# Patient Record
Sex: FEMALE | Race: WHITE | NOT HISPANIC OR LATINO | Employment: OTHER | URBAN - METROPOLITAN AREA
[De-identification: names, ages, dates, MRNs, and addresses within clinical notes are randomized per-mention and may not be internally consistent; named-entity substitution may affect disease eponyms.]

---

## 2017-05-18 ENCOUNTER — ALLSCRIPTS OFFICE VISIT (OUTPATIENT)
Dept: OTHER | Facility: OTHER | Age: 77
End: 2017-05-18

## 2017-05-18 DIAGNOSIS — I10 ESSENTIAL (PRIMARY) HYPERTENSION: ICD-10-CM

## 2017-05-26 ENCOUNTER — HOSPITAL ENCOUNTER (OUTPATIENT)
Dept: NON INVASIVE DIAGNOSTICS | Facility: HOSPITAL | Age: 77
Discharge: HOME | End: 2017-05-26
Attending: INTERNAL MEDICINE
Payer: MEDICARE

## 2017-05-26 DIAGNOSIS — I10 ESSENTIAL (PRIMARY) HYPERTENSION: ICD-10-CM

## 2017-05-26 PROCEDURE — 93306 TTE W/DOPPLER COMPLETE: CPT

## 2018-01-12 VITALS
BODY MASS INDEX: 36.19 KG/M2 | HEIGHT: 64 IN | DIASTOLIC BLOOD PRESSURE: 88 MMHG | WEIGHT: 212 LBS | HEART RATE: 64 BPM | SYSTOLIC BLOOD PRESSURE: 150 MMHG

## 2018-02-23 RX ORDER — ASPIRIN 325 MG
1 TABLET ORAL DAILY
COMMUNITY
Start: 2014-03-20

## 2018-02-23 RX ORDER — LEVOTHYROXINE SODIUM 88 UG/1
1 TABLET ORAL DAILY
COMMUNITY

## 2018-02-27 ENCOUNTER — OFFICE VISIT (OUTPATIENT)
Dept: CARDIOLOGY CLINIC | Facility: HOSPITAL | Age: 78
End: 2018-02-27
Payer: MEDICARE

## 2018-02-27 VITALS
WEIGHT: 213.6 LBS | HEART RATE: 67 BPM | BODY MASS INDEX: 36.47 KG/M2 | SYSTOLIC BLOOD PRESSURE: 134 MMHG | HEIGHT: 64 IN | DIASTOLIC BLOOD PRESSURE: 62 MMHG

## 2018-02-27 DIAGNOSIS — I10 ESSENTIAL HYPERTENSION: ICD-10-CM

## 2018-02-27 DIAGNOSIS — R00.2 PALPITATION: ICD-10-CM

## 2018-02-27 DIAGNOSIS — I35.0 MILD AORTIC STENOSIS: ICD-10-CM

## 2018-02-27 DIAGNOSIS — I47.1 PAROXYSMAL SVT (SUPRAVENTRICULAR TACHYCARDIA) (HCC): Primary | ICD-10-CM

## 2018-02-27 PROBLEM — I47.10 PAROXYSMAL SVT (SUPRAVENTRICULAR TACHYCARDIA): Status: ACTIVE | Noted: 2018-02-27

## 2018-02-27 PROCEDURE — 99214 OFFICE O/P EST MOD 30 MIN: CPT | Performed by: INTERNAL MEDICINE

## 2018-02-27 PROCEDURE — 93000 ELECTROCARDIOGRAM COMPLETE: CPT | Performed by: INTERNAL MEDICINE

## 2018-02-27 NOTE — PROGRESS NOTES
Cardiology Follow Up    Neal Davila  1940  [id number]  [address] [place] [address] [address] [id number]    1  Paroxysmal SVT (supraventricular tachycardia) (HCC)  POCT ECG   2  Mild aortic stenosis     3  Essential hypertension     4  Palpitation           Discussion/Summary: I discuss her SVT and options of medical management, ablation  I also discussed AC in place of ASA  I will d/w EP and call her back  Her episodes were more frequent last year and it is likely that they will occur frequently again  For now, I have now changed her medications  Interval History: She has had 2 episodes of SVT since her last OV  These were c/w with previous SVT which was documented on her Event recorder  The first episode lasted 30 minutes, the second lasted 45 minutes  She denies dizziness or syncope with these episodes  She otherwise is active and denies CP, SOB  Patient Active Problem List   Diagnosis    Paroxysmal SVT (supraventricular tachycardia) (HCC)    Mild aortic stenosis    Palpitation    Essential hypertension     Past Medical History:   Diagnosis Date    Hyperlipidemia     Hypertension     Psychiatric disorder     anxiety     Social History     Social History    Marital status: /Civil Union     Spouse name: N/A    Number of children: N/A    Years of education: N/A     Occupational History    Not on file  Social History Main Topics    Smoking status: Never Smoker    Smokeless tobacco: Never Used    Alcohol use No    Drug use: No    Sexual activity: Not on file     Other Topics Concern    Not on file     Social History Narrative    No narrative on file      No family history on file    Past Surgical History:   Procedure Laterality Date    JOINT REPLACEMENT      TONSILECTOMY AND ADNOIDECTOMY         Current Outpatient Prescriptions:     ALPRAZolam (XANAX) 0 5 mg tablet, Take 0 5 mg by mouth as needed for anxiety  , Disp: , Rfl:     amLODIPine (NORVASC) 10 mg tablet, Take 5 mg by mouth daily  , Disp: , Rfl:     aspirin 325 mg tablet, Take 1 tablet by mouth daily, Disp: , Rfl:     atorvastatin (LIPITOR) 40 mg tablet, Take 40 mg by mouth daily  , Disp: , Rfl:     levothyroxine (SYNTHROID) 88 mcg tablet, Take 1 tablet by mouth daily, Disp: , Rfl:     PARoxetine (PAXIL-CR) 25 mg 24 hr tablet, Take 25 mg by mouth every morning , Disp: , Rfl:     valsartan-hydrochlorothiazide (DIOVAN-HCT) 160-25 MG per tablet, Take 1 tablet by mouth daily  , Disp: , Rfl:   Allergies   Allergen Reactions    Declomycin [Demeclocycline]     Sulfa Antibiotics        Labs:  No visits with results within 2 Month(s) from this visit  Latest known visit with results is:   No results found for any previous visit  Imaging: No results found  EKG:NSR, IRBBB  Review of Systems:  Review of Systems   Constitutional: Negative for diaphoresis, fatigue, fever and unexpected weight change  HENT: Negative  Respiratory: Negative for cough, shortness of breath and wheezing  Cardiovascular: Positive for palpitations  Negative for chest pain and leg swelling  Gastrointestinal: Negative for abdominal pain, diarrhea and nausea  Musculoskeletal: Negative for gait problem and myalgias  Skin: Negative for rash  Neurological: Negative for dizziness and numbness  Psychiatric/Behavioral: Negative  Physical Exam:  Physical Exam   Constitutional: She is oriented to person, place, and time  She appears well-developed and well-nourished  HENT:   Head: Normocephalic and atraumatic  Eyes: Pupils are equal, round, and reactive to light  Neck: Normal range of motion  Neck supple  No JVD present  Cardiovascular: Regular rhythm, S1 normal, S2 normal and normal pulses  Pulses:       Carotid pulses are 2+ on the right side, and 2+ on the left side    Pulmonary/Chest: Effort normal and breath sounds normal  She has no wheezes  She has no rales  Abdominal: Soft  Bowel sounds are normal  There is no tenderness  Musculoskeletal: Normal range of motion  She exhibits no edema or tenderness  Neurological: She is alert and oriented to person, place, and time  She has normal reflexes  No cranial nerve deficit  Skin: Skin is warm  Psychiatric: She has a normal mood and affect

## 2018-03-05 ENCOUNTER — TELEPHONE (OUTPATIENT)
Dept: CARDIOLOGY CLINIC | Facility: CLINIC | Age: 78
End: 2018-03-05

## 2021-04-08 DIAGNOSIS — Z23 ENCOUNTER FOR IMMUNIZATION: ICD-10-CM

## 2024-08-28 ENCOUNTER — TELEPHONE (OUTPATIENT)
Dept: DERMATOLOGY | Facility: CLINIC | Age: 84
End: 2024-08-28

## 2024-08-28 NOTE — TELEPHONE ENCOUNTER
Called and left message for patient's daughter (Michelle), regarding her upcoming appt with Dr. Ramsey on 9/12. Left message that Dr. Ramsey is leaving the practice and that we need to reschedule her appt. Advised we will call back to r/s once Dr. Baltazar Velazquez's schedule is open. Pt's daughter also has an appt the same day with Dr. Ramsey, which we will r/s at the same time.

## 2024-10-21 ENCOUNTER — OFFICE VISIT (OUTPATIENT)
Dept: DERMATOLOGY | Facility: CLINIC | Age: 84
End: 2024-10-21
Payer: MEDICARE

## 2024-10-21 VITALS — BODY MASS INDEX: 33.13 KG/M2 | WEIGHT: 193 LBS | TEMPERATURE: 98.2 F

## 2024-10-21 DIAGNOSIS — L82.1 SEBORRHEIC KERATOSIS: ICD-10-CM

## 2024-10-21 DIAGNOSIS — D18.01 CHERRY ANGIOMA: ICD-10-CM

## 2024-10-21 DIAGNOSIS — L57.0 KERATOSIS, ACTINIC: ICD-10-CM

## 2024-10-21 DIAGNOSIS — L81.4 LENTIGINES: ICD-10-CM

## 2024-10-21 DIAGNOSIS — D22.9 MULTIPLE BENIGN MELANOCYTIC NEVI: ICD-10-CM

## 2024-10-21 DIAGNOSIS — Z12.83 SKIN EXAM, SCREENING FOR CANCER: Primary | ICD-10-CM

## 2024-10-21 DIAGNOSIS — D48.9 NEOPLASM OF UNCERTAIN BEHAVIOR: ICD-10-CM

## 2024-10-21 PROCEDURE — 88305 TISSUE EXAM BY PATHOLOGIST: CPT | Performed by: PATHOLOGY

## 2024-10-21 PROCEDURE — 11102 TANGNTL BX SKIN SINGLE LES: CPT | Performed by: REGISTERED NURSE

## 2024-10-21 PROCEDURE — 99204 OFFICE O/P NEW MOD 45 MIN: CPT | Performed by: REGISTERED NURSE

## 2024-10-21 RX ORDER — FLECAINIDE ACETATE 100 MG/1
100 TABLET ORAL AS NEEDED
COMMUNITY
Start: 2024-07-10

## 2024-10-21 RX ORDER — DIPHENOXYLATE HYDROCHLORIDE AND ATROPINE SULFATE 2.5; .025 MG/1; MG/1
1 TABLET ORAL DAILY
COMMUNITY

## 2024-10-21 RX ORDER — EMPAGLIFLOZIN 10 MG/1
10 TABLET, FILM COATED ORAL DAILY
COMMUNITY
Start: 2024-07-10

## 2024-10-21 RX ORDER — APIXABAN 5 MG/1
5 TABLET, FILM COATED ORAL 2 TIMES DAILY
COMMUNITY
Start: 2024-05-17

## 2024-10-21 RX ORDER — METOPROLOL SUCCINATE 25 MG/1
25 TABLET, EXTENDED RELEASE ORAL DAILY
COMMUNITY
Start: 2024-09-09

## 2024-10-21 NOTE — PROGRESS NOTES
"[facility] Dermatology Clinic Note     Patient Name: Sofya Dobbs  Encounter Date: 10/21/2024     Have you been cared for by a [facility] Dermatologist in the last 3 years and, if so, which description applies to you?    NO.   I am considered a \"new\" patient and must complete all patient intake questions. I am FEMALE/of child-bearing potential.    REVIEW OF SYSTEMS:  Have you recently had or currently have any of the following? Recent fever or chills? No  Any non-healing wound? No  Are you pregnant or planning to become pregnant? No  Are you currently or planning to be nursing or breast feeding? No   PAST MEDICAL HISTORY:  Have you personally ever had or currently have any of the following?  If \"YES,\" then please provide more detail. Skin cancer (such as Melanoma, Basal Cell Carcinoma, Squamous Cell Carcinoma?  No  Tuberculosis, HIV/AIDS, Hepatitis B or C: No  Radiation Treatment No   HISTORY OF IMMUNOSUPPRESSION:   Do you have a history of any of the following:  Systemic Immunosuppression such as Diabetes, Biologic or Immunotherapy, Chemotherapy, Organ Transplantation, Bone Marrow Transplantation or Prednsione?  No    Answering \"YES\" requires the addition of the dotphrase \"IMMUNOSUPPRESSED\" as the first diagnosis of the patient's visit.   FAMILY HISTORY:  Any \"first degree relatives\" (parent, brother, sister, or child) with the following?    Skin Cancer, Pancreatic or Other Cancer? YES, brother skin cancer    PATIENT EXPERIENCE:    Do you want the Dermatologist to perform a COMPLETE skin exam today including a clinical examination under the \"bra and underwear\" areas?  Yes  If necessary, do we have your permission to call and leave a detailed message on your Preferred Phone number that includes your specific medical information?  Yes      Allergies   Allergen Reactions    Declomycin [Demeclocycline]     Sulfa Antibiotics       Current Outpatient Medications:     ALPRAZolam (XANAX) 0.5 mg tablet, Take 0.5 mg by " mouth as needed for anxiety., Disp: , Rfl:     amLODIPine (NORVASC) 10 mg tablet, Take 5 mg by mouth daily  , Disp: , Rfl:     aspirin 325 mg tablet, Take 1 tablet by mouth daily, Disp: , Rfl:     atorvastatin (LIPITOR) 40 mg tablet, Take 40 mg by mouth daily., Disp: , Rfl:     levothyroxine (SYNTHROID) 88 mcg tablet, Take 1 tablet by mouth daily, Disp: , Rfl:     PARoxetine (PAXIL-CR) 25 mg 24 hr tablet, Take 25 mg by mouth every morning., Disp: , Rfl:     valsartan-hydrochlorothiazide (DIOVAN-HCT) 160-25 MG per tablet, Take 1 tablet by mouth daily., Disp: , Rfl:           Whom besides the patient is providing clinical information about today's encounter?   NO ADDITIONAL HISTORIAN (patient alone provided history)    Physical Exam and Assessment/Plan by Diagnosis:    SEBORRHEIC KERATOSES  - Relevant exam: Scattered over the trunk/extremities are waxy brown to black plaques and papules with stuck on appearance and consistent dermoscopy  - Exam and clinical history consistent with seborrheic keratoses  - Counseled that these are benign growths that do not require treatment    MELANOCYTIC NEVI  -Relevant exam: Scattered over the trunk/extremities are homogenously pigmented brown macules and papules. ELM performed and without concerning findings. No outliers unless otherwise noted in today's note  - Exam and clinical history consistent with melanocytic nevi  - Counseled to return to clinic prior to scheduled appointment should any of these lesions change or should any new lesions of concern arise  - Counseled on use of sun protection daily. Reviewed latest FDA sunscreen guidelines, including use of broad spectrum (UVA and UVB blocking) sunscreen or sun protective clothing with SPF 30-50 every 2-3 hours and reapplied after exposure to water    LENTIGINES  OTHER SKIN CHANGES DUE TO CHRONIC EXPOSURE TO NONIONIZING RADIATION  - Relevant exam: Over sun exposed areas are brown macules. ELM performed and without concerning  findings.  - Exam and clinical history consistent with lentigines.  - Counseled to return to clinic prior to scheduled appointment should any of these lesions change or should any new lesions of concern arise.  - Recommended use of sunscreen as above and below.    CHERRY ANGIOMAS  - Relevant exam: Scattered over the trunk/extremities are red papules  - Exam and clinical history consistent with cherry angiomas  - Educated that these are benign      ACTINIC KERATOSES  - Relevant exam: On the left ear helix are scaly pink macules without palpable dermal component    - Exam and clinical history consistent with actinic keratoses  - Discussed that these lesions are considered premalignant with the potential to evolve into squamous cell carcinoma.   - Discussed treatment options, which may inclue liquid nitrogen destruction, topical immunotherapy including risks, benefits  - Patient counseled to return to the office in 4-6 weeks after completion of treatment for recheck if not resolved at which time retreatment or biopsy to rule out SCC will be determined based on clinic findings  - Discussed diagnosis and treatment options. Patient defers treatment at this time.       VITILIGO    Physical Exam:  Anatomic Location Affected:  bilateral hands and lower arms   Morphological Description:  Depigmented patches   Pertinent Positives:  Pertinent Negatives:    Additional History of Present Condition:  Patient reports she has had vitiligo for a very long time. She previously treated with a cream she cannot remember the name but no longer interested in treating. No associated pruritus, tenderness and or burning sensation.     Assessment and Plan:  Based on a thorough discussion of this condition and the management approach to it (including a comprehensive discussion of the known risks, side effects and potential benefits of treatment), the patient (family) agrees to implement the following specific plan:  Reassured benign.   Patient  defers treatment at this time.     Vitiligo  Vitiligo is an acquired depigmenting disorder of the skin, in which pigment cells (melanocytes) are lost. It presents with well-defined milky-white patches of skin (leukoderma). Vitiligo can be cosmetically very disabling, particularly in people with dark skin.    Vitiligo affects 0.5-1% of the population, and occurs in all races. It may be more common in [place] than elsewhere, with reports of up to 8.8% of the population affected. In 50% of sufferers, pigment loss begins before the age of 20, and in about 80% it begins before the age of 30 years. In 20%, other family members also have vitiligo. Males and females are equally affected.    Even though most people with vitiligo are in good general health, they face a greater risk of having autoimmune diseases such as diabetes, thyroid disease (in 20% of patients over 20 years with vitiligo), pernicious anemia (B12 deficiency), [place] disease (adrenal gland disease), systemic lupus erythematosus, rheumatoid arthritis, psoriasis, and alopecia areata (round patches of hair loss).    A vitiligo-like leukoderma may occur in patients with metastatic melanoma. It can also be induced by certain drugs, such as immune checkpoint inhibitors (pembrolizumab, nivolumab) and BRAF inhibitors (vemurafenib, dabrafenib) used to treat metastatic melanoma. Vitiligo is also three times more common in hematology patients that have had allogeneic bone marrow and stem-cell transplants, than in the normal population.    What causes vitiligo?  Vitiligo is due to the loss or destruction of melanocytes, which are the cells that produce melanin. Melanin determines the color of skin, hair, and eyes. If melanocytes cannot form melanin or if their number decreases, skin color becomes progressively lighter.  Vitiligo is thought to be a systemic autoimmune disorder, associated with deregulated innate immune response, although this has been disputed for  segmental vitiligo. There is a genetic susceptibility. Vitiligo is a component of some rare syndromes, such as the Vogt-Koyanagi-Harada syndrome. The gene encoding the melanocyte enzyme tyrosinase, TYR, is likely involved. It has been reported to be drug induced in association with the methylphenidate transdermal system.    What are the clinical features of vitiligo?  Vitiligo can affect any part of the body. Complete loss of pigment can affect a single patch of skin, or it may affect multiple patches. Small patches or macules are sometimes described as confetti-like.  Common sites are exposed areas (face, neck, eyelids, nostrils, fingertips and toes), body folds (armpits, groin), nipples, navel, lips and genitalia.  Vitiligo also favors sites of injury (cuts, scrapes, acne, thermal burns and sunburn). This is called the Koebner phenomenon.  New-onset vitiligo also sometimes follows emotional stress.  Vitiligo may occasionally start as multiple halo naevi.  Loss of color may also affect the hair on the scalp, eyebrows, eyelashes and body. White hair is called 'leukotrichia' or 'poliosis'.  The retina at the back of the eye may also be affected. However, the colour of the iris does not change.    The color of the edge of the white patch can vary.  It is usually the color of unaffected skin, but sometimes it is hyper pigmented or hypo pigmented.  The term trichrome vitiligo is used to describe three shades of skin color. Very rarely, there are four shades of pigment (white, pale brown, dark brown and normal skin).  Occasionally, each patch of vitiligo has an inflamed red border.    The severity of vitiligo differs with each person. There is no way to predict how much pigment an individual will lose or how fast it will be lost.  Vitiligo appears more obvious in patients with naturally dark skin.  Extension of vitiligo can occur over a few months, and then it stabilizes.  Some spontaneous regimentation may occur. Brown  spots arise from the hair follicles, and the overall size of the white patch may reduce.  At some time in the future, the vitiligo begins to extend again.  Cycles of pigment loss followed by periods of stability may continue indefinitely.  Light skinned people usually notice the pigment loss during the summer as the contrast between the affected skin and suntanned skin becomes more distinct.  Pigment has occasionally been reported to be lost from the entire skin surface.  Vitiligo may be associated with a reduced risk of malignancy  It has been observed that patients that develop a form of vitiligo during treatment with an immune checkpoint inhibitor (such as nivolumab or pembrolizumab prescribed for metastatic melanoma) have enhanced survival rates compared to those who do not develop this complication of the treatment. A nationwide population cohort study published in 2019 reported that patients in [place] with a diagnosis of vitiligo had a reduced incidence of internal malignancies such as cancer of the colon, rectum, ovary and lung. The risk of melanoma and keratinocyte cancer also appears to be less in vitiligo patients than in similar patients without vitiligo. However, it should be noted that the risk of thyroid cancer is greater in patients with vitiligo compared to patients without vitiligo.    How is vitiligo diagnosed?  Vitiligo is normally a clinical diagnosis, and no tests are necessary to make the diagnosis. The white patches may be seen more easily under Wood lamp examination (black light).  Occasionally skin biopsy may be recommended, particularly in early or inflammatory vitiligo, when a lymphocytic infiltration may be observed. Melanocytes and epidermal pigment are absent in established vitiligo patches.    Blood tests to assess other potential autoimmune diseases or polyglandular syndromes may be arranged, such as thyroid function, B12 levels and autoantibody screen.  Clinical photographs are useful  "to document the extent of vitiligo for monitoring. Serial digital images may be arranged on follow-up. The extent of vitiligo may be scored according to the body surface area affected by depigmentation.    How is vitiligo treated?  Treatment of vitiligo is currently unsatisfactory. Repigmentation treatment is most successful on face and trunk; hands, feet and areas with white hair respond poorly. Compared to longstanding patches, new ones are more likely to respond to medical therapy.  When successful regimentation occurs, melanocyte stem cells in the bulb at the base of the hair follicle are activated and migrate to the skin surface. They appear as perifollicular brown macules.    General measures  Minimize skin injury: wear protective clothing.  A cut, a graze, a scratch may lead to a new patch of vitiligo.  Cosmetic camouflage can disguise vitiligo.  Options include:  Make-up, dyes and stains  Waterproof products  Dihydroxyacetone-containing products \"tan without sun\"  Micropigmentation or tattooing for stable vitiligo.  White skin can only burn on exposure to ultraviolet radiation (UVR); it cannot tan.  Sunburn may cause vitiligo to spread.  Tanning of normal skin makes vitiligo patches appear more obvious.    Topical treatments  Corticosteroid creams. These can be used for vitiligo on trunk and limbs for up to 3 months. Potent steroids should be avoided on thin-skinned areas of the face (especially eyelids), neck, armpits and groin.  Calcineurin inhibitors (pimecrolimus cream and tacrolimus ointment. These can be used for vitiligo affecting eyelids, face, neck, armpits and groin.  Experimental treatment with topical ruxolitinib, a Janus kinase inhibitor,  shows great promise for facial vitiligo.    Phototherapy  Phototherapy refers to treatment with ultraviolet (UV) radiation. Options include:  Whole-body or localized broadband or narrowband (311 nm) UVB  Excimer laser UVB (308 nm) or targeted UVB for small " "areas of vitiligo  Oral, topical, or bathwater photo chemotherapy (PUVA)  In-office or home phototherapy.    Treatment is usually given twice weekly for a trial period of 3-4 months. If repigmentation is observed, treatment is continued until repigmentation is complete or for a maximum of 1-2 years.  Phototherapy is unsuitable for very fair skinned people.  The treatment intensity aims for the vitiligo skin to be a light \"carnation\" pink.  If repigmentation is observed, treatment is continued until repigmentation is complete or for a maximum of 1-2 years.  Treatment times are generally brief. The aim is to cause the treated skin to appear very slightly pink the following day.  It is important to avoid burning (red, blistered, peeling, itchy or painful skin), as this could cause the vitiligo to get worse.    Systemic therapy  Systemic treatments for vitiligo include:  Oral minocycline, a tetracycline antibiotic with anti-inflammatory properties  Mini-pulses of oral steroids for 3 to 6 months, such as dexamethasone 2.5 mg, two days per week  Subcutaneous afamelanotide.    Surgical treatment of stable vitiligo  Surgical treatment for stable and segmental vitiligo requires removal of the top layer of vitiligo skin (by shaving, dermabrasion, sandpapering or laser) and replacement with pigmented skin removed from another site.  Techniques include:  Non-cultured melanocyte-keratinocyte cell suspension transplantation.  Punch grafting  Blister grafts, formed by suction or cryotherapy  Split skin grafting  Cultured autografts of melanocytes grown in tissue culture.    Depigmentation therapy  Depigmentation therapy, using monobenzyl ether of hydroquinone, may be considered in severely affected, dark-skinned individuals.    Cryotherapy and laser treatment (eg, 755 nm Q-switched alexandrite or 694 nm Q-switched fransico) have also been used successfully to depigment small areas of vitiligo.    Psychosocial effects of " "vitiligo  Vitiligo results in reduced quality of life and psychological difficulties in many patients, especially in adolescents and in females. The psychosocial effects of vitiligo tend to be more severe in some countries, cultures and religions than in others. Family support, counselling and cognitive behavioral treatment can be of benefit.     NEOPLASM OF UNCERTAIN BEHAVIOR OF SKIN    Physical Exam:  (Anatomic Location); (Size and Morphological Description); (Differential Diagnosis):  Left mid back; 1.1 cm x 0.9 cm pink scaly plaque; ddx: bcc versus blk  Pertinent Positives:  Pertinent Negatives:    Additional History of Present Condition:  Noted during exam.     Assessment and Plan:  I have discussed with the patient that a sample of skin via a \"skin biopsy” would be potentially helpful to further make a specific diagnosis under the microscope.  Based on a thorough discussion of this condition and the management approach to it (including a comprehensive discussion of the known risks, side effects and potential benefits of treatment), the patient (family) agrees to implement the following specific plan:    Procedure:  Skin Biopsy.  After a thorough discussion of treatment options and risk/benefits/alternatives (including but not limited to local pain, scarring, dyspigmentation, blistering, possible superinfection, and inability to confirm a diagnosis via histopathology), verbal and written consent were obtained and portion of the rash was biopsied for tissue sample.  See below for consent that was obtained from patient and subsequent Procedure Note.    PROCEDURE TANGENTIAL (SHAVE) BIOPSY NOTE:    Performing Physician:  Dr. Jarquin  Anatomic Location; Clinical Description with size (cm); Pre-Op Diagnosis:   Left mid back; 1.1 cm x 0.9 cm pink scaly plaque (partial biopsy); ddx: bcc versus blk  Post-op diagnosis: Same     Local anesthesia: 1% xylocaine with epi      Topical anesthesia: None    Hemostasis: " "Aluminum chloride       After obtaining informed consent  at which time there was a discussion about the purpose of biopsy  and low risks of infection and bleeding.  The area was prepped and draped in the usual fashion. Anesthesia was obtained with 1% lidocaine with epinephrine. A shave biopsy to an appropriate sampling depth was obtained by Shave (Dermablade or 15 blade) The resulting wound was covered with surgical ointment and bandaged appropriately.     The patient tolerated the procedure well without complications and was without signs of functional compromise.      Specimen has been sent for review by Dermatopathology.    Standard post-procedure care has been explained and has been included in written form within the patient's copy of Informed Consent.    INFORMED CONSENT DISCUSSION AND POST-OPERATIVE INSTRUCTIONS FOR PATIENT    I.  RATIONALE FOR PROCEDURE  I understand that a skin biopsy allows the Dermatologist to test a lesion or rash under the microscope to obtain a diagnosis.  It usually involves numbing the area with numbing medication and removing a small piece of skin; sometimes the area will be closed with sutures. In this specific procedure, sutures are not usually needed.  If any sutures are placed, then they are usually need to be removed in 2 weeks or less.    I understand that my Dermatologist recommends that a skin \"shave\" biopsy be performed today.  A local anesthetic, similar to the kind that a dentist uses when filling a cavity, will be injected with a very small needle into the skin area to be sampled.  The injected skin and tissue underneath \"will go to sleep” and become numb so no pain should be felt afterwards.  An instrument shaped like a tiny \"razor blade\" (shave biopsy instrument) will be used to cut a small piece of tissue and skin from the area so that a sample of tissue can be taken and examined more closely under the microscope.  A slight amount of bleeding will occur, but it will " "be stopped with direct pressure and a pressure bandage and any other appropriate methods.  I understands that a scar will form where the wound was created.  Surgical ointment will be applied to help protect the wound.  Sutures are not usually needed.    II.  RISKS AND POTENTIAL COMPLICATIONS   I understand the risks and potential complications of a skin biopsy include but are not limited to the following:  Bleeding  Infection  Pain  Scar/keloid  Skin discoloration  Incomplete Removal  Recurrence  Nerve Damage/Numbness/Loss of Function  Allergic Reaction to Anesthesia  Biopsies are diagnostic procedures and based on findings additional treatment or evaluation may be required  Loss or destruction of specimen resulting in no additional findings    My Dermatologist has explained to me the nature of the condition, the nature of the procedure, and the benefits to be reasonably expected compared with alternative approaches.  My Dermatologist has discussed the likelihood of major risks or complications of this procedure including the specific risks listed above, such as bleeding, infection, and scarring/keloid.  I understand that a scar is expected after this procedure.  I understand that my physician cannot predict if the scar will form a \"keloid,\" which extends beyond the borders of the wound that is created.  A keloid is a thick, painful, and bumpy scar.  A keloid can be difficult to treat, as it does not always respond well to therapy, which includes injecting cortisone directly into the keloid every few weeks.  While this usually reduces the pain and size of the scar, it does not eliminate it.      I understand that photographs may be taken before and after the procedure.  These will be maintained as part of the medical providers confidential records and may not be made available to me.  I further authorize the medical provider to use the photographs for teaching purposes or to illustrate scientific papers, books, or " "lectures if in his/her judgment, medical research, education, or science may benefit from its use.    I have had an opportunity to fully inquire about the risks and benefits of this procedure and its alternatives.   I have been given ample time and opportunity to ask questions and to seek a second opinion if I wished to do so.  I acknowledge that there have specifically been no guarantees as to the cosmetic results from the procedure.  I am aware that with any procedure there is always the possibility of an unexpected complication.    III. POST-PROCEDURAL CARE (WHAT YOU WILL NEED TO DO \"AFTER THE BIOPSY\" TO OPTIMIZE HEALING)    Keep the area clean and dry.  Try NOT to remove the bandage or get it wet for the first 24 hours.    Gently clean the area and apply surgical ointment (such as Vaseline petrolatum ointment, which is available \"over the counter\" and not a prescription) to the biopsy site for up to 2 weeks straight.  This acts to protect the wound from the outside world.      Sutures are not usually placed in this procedure.  If any sutures were placed, return for suture removal as instructed (generally 1 week for the face, 2 weeks for the body).      Take Acetaminophen (Tylenol) for discomfort, if no contraindications.  Ibuprofen or aspirin could make bleeding worse.    Call our office immediately for signs of infection: fever, chills, increased redness, warmth, tenderness, discomfort/pain, or pus or foul smell coming from the wound.    WHAT TO DO IF THERE IS ANY BLEEDING?  If a small amount of bleeding is noticed, place a clean cloth over the area and apply firm pressure for ten minutes.  Check the wound after 10 minutes of direct pressure.  If bleeding persists, try one more time for an additional 10 minutes of direct pressure on the area.  If the bleeding becomes heavier or does not stop after the second attempt, or if you have any other questions about this procedure, then please call your [facility] " Dermatologist by calling [phone] (SKIN).     I hereby acknowledge that I have reviewed and verified the site with my Dermatologist and have requested and authorized my Dermatologist to proceed with the procedure.      Scribe Attestation      I,:  Yudith Dickey MA am acting as a scribe while in the presence of the attending physician.:       I,:  Felipe Jarquin MD personally performed the services described in this documentation    as scribed in my presence.:

## 2024-10-21 NOTE — PATIENT INSTRUCTIONS
Physical Exam and Assessment/Plan by Diagnosis:     SEBORRHEIC KERATOSES  - Relevant exam: Scattered over the trunk/extremities are waxy brown to black plaques and papules with stuck on appearance and consistent dermoscopy  - Exam and clinical history consistent with seborrheic keratoses  - Counseled that these are benign growths that do not require treatment     MELANOCYTIC NEVI  -Relevant exam: Scattered over the trunk/extremities are homogenously pigmented brown macules and papules. ELM performed and without concerning findings. No outliers unless otherwise noted in today's note  - Exam and clinical history consistent with melanocytic nevi  - Counseled to return to clinic prior to scheduled appointment should any of these lesions change or should any new lesions of concern arise  - Counseled on use of sun protection daily. Reviewed latest FDA sunscreen guidelines, including use of broad spectrum (UVA and UVB blocking) sunscreen or sun protective clothing with SPF 30-50 every 2-3 hours and reapplied after exposure to water     LENTIGINES  OTHER SKIN CHANGES DUE TO CHRONIC EXPOSURE TO NONIONIZING RADIATION  - Relevant exam: Over sun exposed areas are brown macules. ELM performed and without concerning findings.  - Exam and clinical history consistent with lentigines.  - Counseled to return to clinic prior to scheduled appointment should any of these lesions change or should any new lesions of concern arise.  - Recommended use of sunscreen as above and below.     CHERRY ANGIOMAS  - Relevant exam: Scattered over the trunk/extremities are red papules  - Exam and clinical history consistent with cherry angiomas  - Educated that these are benign        ACTINIC KERATOSES  - Relevant exam: On the left ear helix are scaly pink macules without palpable dermal component     - Exam and clinical history consistent with actinic keratoses  - Discussed that these lesions are considered premalignant with the potential to evolve  into squamous cell carcinoma.   - Discussed treatment options, which may inclue liquid nitrogen destruction, topical immunotherapy including risks, benefits  - Patient counseled to return to the office in 4-6 weeks after completion of treatment for recheck if not resolved at which time retreatment or biopsy to rule out SCC will be determined based on clinic findings      VITILIGO       Vitiligo  Vitiligo is an acquired depigmenting disorder of the skin, in which pigment cells (melanocytes) are lost. It presents with well-defined milky-white patches of skin (leukoderma). Vitiligo can be cosmetically very disabling, particularly in people with dark skin.     Vitiligo affects 0.5-1% of the population, and occurs in all races. It may be more common in [place] than elsewhere, with reports of up to 8.8% of the population affected. In 50% of sufferers, pigment loss begins before the age of 20, and in about 80% it begins before the age of 30 years. In 20%, other family members also have vitiligo. Males and females are equally affected.     Even though most people with vitiligo are in good general health, they face a greater risk of having autoimmune diseases such as diabetes, thyroid disease (in 20% of patients over 20 years with vitiligo), pernicious anemia (B12 deficiency), [place] disease (adrenal gland disease), systemic lupus erythematosus, rheumatoid arthritis, psoriasis, and alopecia areata (round patches of hair loss).     A vitiligo-like leukoderma may occur in patients with metastatic melanoma. It can also be induced by certain drugs, such as immune checkpoint inhibitors (pembrolizumab, nivolumab) and BRAF inhibitors (vemurafenib, dabrafenib) used to treat metastatic melanoma. Vitiligo is also three times more common in hematology patients that have had allogeneic bone marrow and stem-cell transplants, than in the normal population.     What causes vitiligo?  Vitiligo is due to the loss or destruction of  melanocytes, which are the cells that produce melanin. Melanin determines the color of skin, hair, and eyes. If melanocytes cannot form melanin or if their number decreases, skin color becomes progressively lighter.  Vitiligo is thought to be a systemic autoimmune disorder, associated with deregulated innate immune response, although this has been disputed for segmental vitiligo. There is a genetic susceptibility. Vitiligo is a component of some rare syndromes, such as the Vogt-Koyanagi-Harada syndrome. The gene encoding the melanocyte enzyme tyrosinase, TYR, is likely involved. It has been reported to be drug induced in association with the methylphenidate transdermal system.     What are the clinical features of vitiligo?  Vitiligo can affect any part of the body. Complete loss of pigment can affect a single patch of skin, or it may affect multiple patches. Small patches or macules are sometimes described as confetti-like.  Common sites are exposed areas (face, neck, eyelids, nostrils, fingertips and toes), body folds (armpits, groin), nipples, navel, lips and genitalia.  Vitiligo also favors sites of injury (cuts, scrapes, acne, thermal burns and sunburn). This is called the Koebner phenomenon.  New-onset vitiligo also sometimes follows emotional stress.  Vitiligo may occasionally start as multiple halo naevi.  Loss of color may also affect the hair on the scalp, eyebrows, eyelashes and body. White hair is called 'leukotrichia' or 'poliosis'.  The retina at the back of the eye may also be affected. However, the colour of the iris does not change.     The color of the edge of the white patch can vary.  It is usually the color of unaffected skin, but sometimes it is hyper pigmented or hypo pigmented.  The term trichrome vitiligo is used to describe three shades of skin color. Very rarely, there are four shades of pigment (white, pale brown, dark brown and normal skin).  Occasionally, each patch of vitiligo has an  inflamed red border.     The severity of vitiligo differs with each person. There is no way to predict how much pigment an individual will lose or how fast it will be lost.  Vitiligo appears more obvious in patients with naturally dark skin.  Extension of vitiligo can occur over a few months, and then it stabilizes.  Some spontaneous regimentation may occur. Brown spots arise from the hair follicles, and the overall size of the white patch may reduce.  At some time in the future, the vitiligo begins to extend again.  Cycles of pigment loss followed by periods of stability may continue indefinitely.  Light skinned people usually notice the pigment loss during the summer as the contrast between the affected skin and suntanned skin becomes more distinct.  Pigment has occasionally been reported to be lost from the entire skin surface.  Vitiligo may be associated with a reduced risk of malignancy  It has been observed that patients that develop a form of vitiligo during treatment with an immune checkpoint inhibitor (such as nivolumab or pembrolizumab prescribed for metastatic melanoma) have enhanced survival rates compared to those who do not develop this complication of the treatment. A nationwide population cohort study published in 2019 reported that patients in [place] with a diagnosis of vitiligo had a reduced incidence of internal malignancies such as cancer of the colon, rectum, ovary and lung. The risk of melanoma and keratinocyte cancer also appears to be less in vitiligo patients than in similar patients without vitiligo. However, it should be noted that the risk of thyroid cancer is greater in patients with vitiligo compared to patients without vitiligo.     How is vitiligo diagnosed?  Vitiligo is normally a clinical diagnosis, and no tests are necessary to make the diagnosis. The white patches may be seen more easily under Wood lamp examination (black light).  Occasionally skin biopsy may be recommended,  "particularly in early or inflammatory vitiligo, when a lymphocytic infiltration may be observed. Melanocytes and epidermal pigment are absent in established vitiligo patches.     Blood tests to assess other potential autoimmune diseases or polyglandular syndromes may be arranged, such as thyroid function, B12 levels and autoantibody screen.  Clinical photographs are useful to document the extent of vitiligo for monitoring. Serial digital images may be arranged on follow-up. The extent of vitiligo may be scored according to the body surface area affected by depigmentation.     How is vitiligo treated?  Treatment of vitiligo is currently unsatisfactory. Repigmentation treatment is most successful on face and trunk; hands, feet and areas with white hair respond poorly. Compared to longstanding patches, new ones are more likely to respond to medical therapy.  When successful regimentation occurs, melanocyte stem cells in the bulb at the base of the hair follicle are activated and migrate to the skin surface. They appear as perifollicular brown macules.     General measures  Minimize skin injury: wear protective clothing.  A cut, a graze, a scratch may lead to a new patch of vitiligo.  Cosmetic camouflage can disguise vitiligo.  Options include:  Make-up, dyes and stains  Waterproof products  Dihydroxyacetone-containing products \"tan without sun\"  Micropigmentation or tattooing for stable vitiligo.  White skin can only burn on exposure to ultraviolet radiation (UVR); it cannot tan.  Sunburn may cause vitiligo to spread.  Tanning of normal skin makes vitiligo patches appear more obvious.     Topical treatments  Corticosteroid creams. These can be used for vitiligo on trunk and limbs for up to 3 months. Potent steroids should be avoided on thin-skinned areas of the face (especially eyelids), neck, armpits and groin.  Calcineurin inhibitors (pimecrolimus cream and tacrolimus ointment. These can be used for vitiligo " "affecting eyelids, face, neck, armpits and groin.  Experimental treatment with topical ruxolitinib, a Janus kinase inhibitor,  shows great promise for facial vitiligo.     Phototherapy  Phototherapy refers to treatment with ultraviolet (UV) radiation. Options include:  Whole-body or localized broadband or narrowband (311 nm) UVB  Excimer laser UVB (308 nm) or targeted UVB for small areas of vitiligo  Oral, topical, or bathwater photo chemotherapy (PUVA)  In-office or home phototherapy.     Treatment is usually given twice weekly for a trial period of 3-4 months. If repigmentation is observed, treatment is continued until repigmentation is complete or for a maximum of 1-2 years.  Phototherapy is unsuitable for very fair skinned people.  The treatment intensity aims for the vitiligo skin to be a light \"carnation\" pink.  If repigmentation is observed, treatment is continued until repigmentation is complete or for a maximum of 1-2 years.  Treatment times are generally brief. The aim is to cause the treated skin to appear very slightly pink the following day.  It is important to avoid burning (red, blistered, peeling, itchy or painful skin), as this could cause the vitiligo to get worse.     Systemic therapy  Systemic treatments for vitiligo include:  Oral minocycline, a tetracycline antibiotic with anti-inflammatory properties  Mini-pulses of oral steroids for 3 to 6 months, such as dexamethasone 2.5 mg, two days per week  Subcutaneous afamelanotide.     Surgical treatment of stable vitiligo  Surgical treatment for stable and segmental vitiligo requires removal of the top layer of vitiligo skin (by shaving, dermabrasion, sandpapering or laser) and replacement with pigmented skin removed from another site.  Techniques include:  Non-cultured melanocyte-keratinocyte cell suspension transplantation.  Punch grafting  Blister grafts, formed by suction or cryotherapy  Split skin grafting  Cultured autografts of melanocytes " "grown in tissue culture.     Depigmentation therapy  Depigmentation therapy, using monobenzyl ether of hydroquinone, may be considered in severely affected, dark-skinned individuals.     Cryotherapy and laser treatment (eg, 755 nm Q-switched alexandrite or 694 nm Q-switched fransico) have also been used successfully to depigment small areas of vitiligo.     Psychosocial effects of vitiligo  Vitiligo results in reduced quality of life and psychological difficulties in many patients, especially in adolescents and in females. The psychosocial effects of vitiligo tend to be more severe in some countries, cultures and religions than in others. Family support, counselling and cognitive behavioral treatment can be of benefit.     NEOPLASM OF UNCERTAIN BEHAVIOR OF SKIN       Assessment and Plan:  I have discussed with the patient that a sample of skin via a \"skin biopsy” would be potentially helpful to further make a specific diagnosis under the microscope.  Based on a thorough discussion of this condition and the management approach to it (including a comprehensive discussion of the known risks, side effects and potential benefits of treatment), the patient (family) agrees to implement the following specific plan:     Procedure:  Skin Biopsy.  After a thorough discussion of treatment options and risk/benefits/alternatives (including but not limited to local pain, scarring, dyspigmentation, blistering, possible superinfection, and inability to confirm a diagnosis via histopathology), verbal and written consent were obtained and portion of the rash was biopsied for tissue sample.  See below for consent that was obtained from patient and subsequent Procedure Note.     PROCEDURE TANGENTIAL (SHAVE) BIOPSY NOTE:     INFORMED CONSENT DISCUSSION AND POST-OPERATIVE INSTRUCTIONS FOR PATIENT     I.  RATIONALE FOR PROCEDURE  I understand that a skin biopsy allows the Dermatologist to test a lesion or rash under the microscope to obtain a " "diagnosis.  It usually involves numbing the area with numbing medication and removing a small piece of skin; sometimes the area will be closed with sutures. In this specific procedure, sutures are not usually needed.  If any sutures are placed, then they are usually need to be removed in 2 weeks or less.     I understand that my Dermatologist recommends that a skin \"shave\" biopsy be performed today.  A local anesthetic, similar to the kind that a dentist uses when filling a cavity, will be injected with a very small needle into the skin area to be sampled.  The injected skin and tissue underneath \"will go to sleep” and become numb so no pain should be felt afterwards.  An instrument shaped like a tiny \"razor blade\" (shave biopsy instrument) will be used to cut a small piece of tissue and skin from the area so that a sample of tissue can be taken and examined more closely under the microscope.  A slight amount of bleeding will occur, but it will be stopped with direct pressure and a pressure bandage and any other appropriate methods.  I understands that a scar will form where the wound was created.  Surgical ointment will be applied to help protect the wound.  Sutures are not usually needed.     II.  RISKS AND POTENTIAL COMPLICATIONS   I understand the risks and potential complications of a skin biopsy include but are not limited to the following:  Bleeding  Infection  Pain  Scar/keloid  Skin discoloration  Incomplete Removal  Recurrence  Nerve Damage/Numbness/Loss of Function  Allergic Reaction to Anesthesia  Biopsies are diagnostic procedures and based on findings additional treatment or evaluation may be required  Loss or destruction of specimen resulting in no additional findings     My Dermatologist has explained to me the nature of the condition, the nature of the procedure, and the benefits to be reasonably expected compared with alternative approaches.  My Dermatologist has discussed the likelihood of major " "risks or complications of this procedure including the specific risks listed above, such as bleeding, infection, and scarring/keloid.  I understand that a scar is expected after this procedure.  I understand that my physician cannot predict if the scar will form a \"keloid,\" which extends beyond the borders of the wound that is created.  A keloid is a thick, painful, and bumpy scar.  A keloid can be difficult to treat, as it does not always respond well to therapy, which includes injecting cortisone directly into the keloid every few weeks.  While this usually reduces the pain and size of the scar, it does not eliminate it.       I understand that photographs may be taken before and after the procedure.  These will be maintained as part of the medical providers confidential records and may not be made available to me.  I further authorize the medical provider to use the photographs for teaching purposes or to illustrate scientific papers, books, or lectures if in his/her judgment, medical research, education, or science may benefit from its use.     I have had an opportunity to fully inquire about the risks and benefits of this procedure and its alternatives.   I have been given ample time and opportunity to ask questions and to seek a second opinion if I wished to do so.  I acknowledge that there have specifically been no guarantees as to the cosmetic results from the procedure.  I am aware that with any procedure there is always the possibility of an unexpected complication.    III. POST-PROCEDURAL CARE (WHAT YOU WILL NEED TO DO \"AFTER THE BIOPSY\" TO OPTIMIZE HEALING)     Keep the area clean and dry.  Try NOT to remove the bandage or get it wet for the first 24 hours.     Gently clean the area and apply surgical ointment (such as Vaseline petrolatum ointment, which is available \"over the counter\" and not a prescription) to the biopsy site for up to 2 weeks straight.  This acts to protect the wound from the outside " world.       Sutures are not usually placed in this procedure.  If any sutures were placed, return for suture removal as instructed (generally 1 week for the face, 2 weeks for the body).       Take Acetaminophen (Tylenol) for discomfort, if no contraindications.  Ibuprofen or aspirin could make bleeding worse.     Call our office immediately for signs of infection: fever, chills, increased redness, warmth, tenderness, discomfort/pain, or pus or foul smell coming from the wound.     WHAT TO DO IF THERE IS ANY BLEEDING?  If a small amount of bleeding is noticed, place a clean cloth over the area and apply firm pressure for ten minutes.  Check the wound after 10 minutes of direct pressure.  If bleeding persists, try one more time for an additional 10 minutes of direct pressure on the area.  If the bleeding becomes heavier or does not stop after the second attempt, or if you have any other questions about this procedure, then please call your [facility]'s Dermatologist by calling [phone] (SKIN).      I hereby acknowledge that I have reviewed and verified the site with my Dermatologist and have requested and authorized my Dermatologist to proceed with the procedure.

## 2024-10-23 ENCOUNTER — TELEPHONE (OUTPATIENT)
Age: 84
End: 2024-10-23

## 2024-10-23 NOTE — TELEPHONE ENCOUNTER
Error.    Spoke with Michelle patient's daughter she is calling to find out exactly what her mother was injected with during her appointment on 10/21/2024. Patient developed a reaction within 2 hours of administering injection. Her blood sugar spiked up, thyroid, potassium levels were off. She also mentioned that Afib wasn't stable.      Michelle spoke with pcp and cardiologist and they advised her to contact our office to find out exactly what was given. She asked for exact percentage of lidocaine w/ epinephrine if there were any additive coagulant and exact quantity of what was given since she stated two syringes was administered per daughter.     Please advise they need information to provide this to pcp at her appointment tomorrow before 11:00am.      Michelle's direct number is [phone].

## 2024-10-23 NOTE — TELEPHONE ENCOUNTER
Spoke with Michelle patient's daughter she is calling to find out exactly what her mother was injected with during her appointment on 10/21/2024. Patient developed a reaction within 2 hours of administering injection. Her blood sugar spiked up, thyroid, potassium levels were off. She also mentioned that Afib wasn't stable.     Michelle spoke with pcp and cardiologist and they advised her to contact our office to find out exactly what was given. She asked for exact percentage of lidocaine w/ epinephrine if there were any additive coagulant and exact quantity of what was given since she stated two syringes was administered per daughter.    Please advise they need information to provide this to pcp at her appointment tomorrow before 11:00am.     Michelle's direct number is [phone].

## 2024-10-23 NOTE — TELEPHONE ENCOUNTER
Patient's daughter called stating that right after patient's visit she was taken to the hospital because they think she got an allergic reaction to the injections she was given at the visit. Her blood sugar levels and Thyroid levels were isabel and she was asked to give us a call to ask what she was given and the exact dose.  Triaged call/Disha

## 2024-10-23 NOTE — TELEPHONE ENCOUNTER
Pt was seen 10/21/24, 1%lido with epi was used for bx     · Performing Physician:  Dr. Jarquin  · Anatomic Location; Clinical Description with size (cm); Pre-Op Diagnosis:   º Left mid back; 1.1 cm x 0.9 cm pink scaly plaque (partial biopsy); ddx: bcc versus blk  · Post-op diagnosis: Same     · Local anesthesia: 1% xylocaine with epi      · Topical anesthesia: None    · Hemostasis: Aluminum chloride

## 2024-10-25 PROCEDURE — 88305 TISSUE EXAM BY PATHOLOGIST: CPT | Performed by: PATHOLOGY

## 2024-10-25 NOTE — TELEPHONE ENCOUNTER
Patient's daughter calling again to let us know that no one from [facility] can  not access to her chart to review the information of the medication that was used on 10/21/2024.   Patient does not have access at this time to my chart , she would like to know if this information can be  emailed to her [email or web address]

## 2024-10-25 NOTE — TELEPHONE ENCOUNTER
We can't sent to personal email, can patient need to sign a record release.     Ph called to pt spoke with Daughter, advise her we can't send to personal email, will stop in our office to get a copy of record.

## 2024-10-28 DIAGNOSIS — C44.519 BASAL CELL CARCINOMA (BCC) OF SKIN OF OTHER PART OF TORSO: Primary | ICD-10-CM

## 2024-10-28 RX ORDER — IMIQUIMOD 12.5 MG/.25G
CREAM TOPICAL
Qty: 24 EACH | Refills: 3 | Status: SHIPPED | OUTPATIENT
Start: 2024-10-28

## 2024-10-28 NOTE — RESULT ENCOUNTER NOTE
DERMATOPATHOLOGY RESULT NOTE    Results reviewed by ordering physician.  Called patient to personally discuss results. Discussed results with daughter.       Instructions for Clinical Derm Team:   (remember to route Result Note to appropriate staff):    None    Result & Plan by Specimen:    Specimen A: malignant  Plan: Imiquimod 5% cream apply to affected areas 5 night a week for the next 6 weeks.       A. Skin, left mid back:  BASAL CELL CARCINOMA, SUPERFICIAL    Electronically signed by Yariel Meredith MD on 10/25/2024 at 12:35 PM

## 2024-10-28 NOTE — PROGRESS NOTES
Patient's daughter previously called about patient having an Afib a few hours after shave biopsy in clinic. Patient had to be sent to the ER as her flecainide wasn't breaking the Afib. She was treated with an IV antiarrythmic medication. She also had elevated glucose and thyroid levels at that ED visit.     Spoke to daughter today and patient is doing better. Discussed biopsy results and treatment including excision, ED&C, however given her reaction to lido/epi local anesthesia we agreed to treat with imiquimod.  -  Advised to apply imiquimod 5 nights a week for the next 6 weeks.   - Advised to monitor area and let us know if there's no active lesion there in about 10 weeks time.   - Advised to reach out to us for further evaluation if there's any active lesion  - Will evaluate site at her 6 months appointment       A. Skin, left mid back:  BASAL CELL CARCINOMA, SUPERFICIAL      Electronically signed by Yariel Meredith MD on 10/25/2024 at 12:35 PM

## 2024-10-29 ENCOUNTER — TELEPHONE (OUTPATIENT)
Dept: DERMATOLOGY | Facility: CLINIC | Age: 84
End: 2024-10-29

## 2024-10-29 NOTE — TELEPHONE ENCOUNTER
Per inbasket message-    Felipe Jarquin MD sent to  [facility]  Hello, kindly schedule a 6 month FBSE with me.    Thanks    Called pt but n/a, left v/m with appt info and asked pt to c/b to confirm or r/s if needed.